# Patient Record
Sex: FEMALE | Race: WHITE | URBAN - METROPOLITAN AREA
[De-identification: names, ages, dates, MRNs, and addresses within clinical notes are randomized per-mention and may not be internally consistent; named-entity substitution may affect disease eponyms.]

---

## 2020-03-26 ENCOUNTER — VIRTUAL VISIT (OUTPATIENT)
Dept: FAMILY MEDICINE | Facility: OTHER | Age: 40
End: 2020-03-26

## 2020-03-26 NOTE — PROGRESS NOTES
"Date: 2020 10:18:54  Clinician: Laurita Simons  Clinician NPI: [id number]  Patient: Ama Ellis  Patient : 1980  Patient Address: [address] [place] [address]  Patient Phone: [phone]  Visit Protocol: URI  Patient Summary:  Ama is a 39 year old ( : 1980 ) female who initiated a Visit for COVID-19 (Coronavirus) evaluation and screening. When asked the question \"Please sign me up to receive news, health information and promotions from [email or web address].\", Ama responded \"No\".    Ama states her symptoms started suddenly 3-6 days ago.   Her symptoms consist of ear pain, a sore throat, a cough, nasal congestion, malaise, enlarged lymph nodes, a headache, myalgia, and chills. She is experiencing mild difficulty breathing with activities but can speak normally in full sentences.   Symptom details     Nasal secretions: The color of her mucus is yellow.    Cough: Ama coughs every 5-10 minutes and her cough is not more bothersome at night. Phlegm does not come into her throat when she coughs. She does not believe her cough is caused by post-nasal drip.     Sore throat: Ama reports having mild throat pain (1-3 on a 10 point pain scale), does not have exudate on her tonsils, and can swallow liquids. The lymph nodes in her neck are enlarged. A rash has not appeared on the skin since the sore throat started.     Headache: She states the headache is mild (1-3 on a 10 point pain scale).      Ama denies having rhinitis, wheezing, teeth pain, fever, and facial pain or pressure. She also denies taking antibiotic medication for the symptoms, having recent facial or sinus surgery in the past 60 days, double sickening (worsening symptoms after initial improvement), and having a sinus infection within the past year.   Precipitating events  Within the past week, Ama has not been exposed to someone with strep throat. She has not recently been exposed to someone with influenza. Ama has been in close contact with the " following high risk individuals: people with asthma, heart disease or diabetes.   Pertinent COVID-19 (Coronavirus) information  Ama has not traveled internationally or to the areas where COVID-19 (Coronavirus) is widespread, including cruise ship travel in the last 14 days before the start of her symptoms.   Ama has not had a close contact with a laboratory-confirmed COVID-19 patient within 14 days of symptom onset. She also has not had a close contact with a suspected COVID-19 patient within 14 days of symptom onset.   Ama is not a healthcare worker or a  and does not work in a healthcare facility. She does not live with a healthcare worker.   Pertinent medical history  Ama does not get yeast infections when she takes antibiotics.   Ama does not need a return to work/school note.   Weight: 195 lbs   Ama does not smoke or use smokeless tobacco.   She denies pregnancy and denies breastfeeding. She does not menstruate.   Additional information as reported by the patient (free text): I have Kidney disease, asthma, hypertension, hypothyroid   Day 4 of diarrhea, nausea, loss of appetite. Those symptoms started first. Chest feels tight and heavy. Sore throat has been 2-3 but more 4 today. Coughing increasing daily. 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 I have 3 high risk kids at home that were premature/low birth weight, 2 have asthma, 2 also with kidney disease. 2 have developed coughs in the last 3 days since I've become ill.   Weight: 195 lbs    MEDICATIONS: conjugated estrogens injection, amiloride-hydrochlorothiazide oral, levothyroxine oral, metformin oral, Wellbutrin XL oral, EnLyte oral, vilazodone oral, lisinopril oral, albuterol sulfate inhalation, Imitrex oral, Vyvanse oral, budesonide-formoterol inhalation, ALLERGIES: prochlorperazine, ondansetron, aripiprazole, metoclopramide, prednisone, lurasidone, haloperidol, lactose  Clinician Response:  Dear  Ama,   Based on the information you have provided, you do have symptoms that are consistent with Coronavirus (COVID-19).  The coronavirus causes mild to severe respiratory illness with the most common symptoms including fever, cough and difficulty breathing. Unfortunately, many viruses cause similar symptoms and it can be difficult to distinguish between viruses, especially in mild cases, so we are presuming that anyone with cough or fever has coronavirus at this time.  Coronavirus/COVID-19 has reached the point of community spread in [place], meaning that we are finding the virus in people with no known exposure risk for abby the virus. Given the increasing commonness of coronavirus in the community we are no longer testing patients who are not critically ill.  If you are a health care worker, you should refer to your employee health office for instructions about testing and returning to work.  For everyone else who has cough or fever, you should assume you are infected with coronavirus. Since you will not be tested but have symptoms that may be consistent with coronavirus, the CDC recommends you stay in self-isolation until these three things have happened:    You have had no fever for at least 72 hours (that is three full days of no fever without the use of medicine that reduces fevers)    AND   Other symptoms have improved (for example, when your cough or shortness of breath have improved)   AND   At least 7 days have passed since your symptoms first appeared.   How to Isolate:   Isolate yourself at home.  Do Not allow any visitors  Do Not go to work or school  Do Not go to [facility],  centers, shopping, or other public places.  Do Not shake hands.  Avoid close contact with others (hugging, kissing).   Protect Others:   Cover Your Mouth and Nose with a mask, disposable tissue or wash cloth to avoid spreading germs to others.  Wash your hands and face frequently with soap and water.   We know it  can be scary to hear that you might have COVID-19. Our team can help track your symptoms and make sure you are doing ok over the next two weeks using a program called [employer] to keep in touch. When you receive an email from [facility], please consider enrolling in our monitoring program. There is no cost to you for monitoring. Here is a URL where you can learn more: [email or web address]  Managing Symptoms:   At this time, we primarily recommend Tylenol (Acetaminophen) for fever or pain. If you have liver or kidney problems, contact your primary care provider for instructions on use of tylenol. Adults can take 650 mg (two 325 mg pills) by mouth every 4-6 hours as needed OR 1,000 mg (two 500 mg pills) every 8 hours as needed. MAXIMUM DAILY DOSE: 3,000mg. For children, refer to dosing on bottle based on age or weight.   If you develop significant shortness of breath that prevents you from doing normal activities, please call 911 or proceed to the nearest emergency room and alert them immediately that you have been in self-isolation for possible coronavirus.  For more information about COVID19 and options for caring for yourself at home, please visit the CDC website at https://www.cdc.gov/coronavirus/2019-ncov/about/steps-when-sick.htmlFor more options for care at [facility], please visit our website at [email or web address]    Diagnosis: Cough  Diagnosis ICD: R05  Additional Clinician Notes: if symptoms worsen, please re submit a visit so we can refer you to next level of care. [facility] providers will contact you to sign up for being watched daily, by email, and are available for questions